# Patient Record
Sex: MALE | Race: WHITE | NOT HISPANIC OR LATINO | Employment: OTHER | URBAN - METROPOLITAN AREA
[De-identification: names, ages, dates, MRNs, and addresses within clinical notes are randomized per-mention and may not be internally consistent; named-entity substitution may affect disease eponyms.]

---

## 2023-12-15 ENCOUNTER — HOSPITAL ENCOUNTER (OUTPATIENT)
Dept: RADIOLOGY | Facility: HOSPITAL | Age: 88
Discharge: HOME | End: 2023-12-15
Payer: MEDICARE

## 2023-12-15 DIAGNOSIS — R10.9 UNSPECIFIED ABDOMINAL PAIN: ICD-10-CM

## 2023-12-15 PROCEDURE — 76770 US EXAM ABDO BACK WALL COMP: CPT

## 2023-12-15 PROCEDURE — 76770 US EXAM ABDO BACK WALL COMP: CPT | Performed by: STUDENT IN AN ORGANIZED HEALTH CARE EDUCATION/TRAINING PROGRAM

## 2023-12-19 ENCOUNTER — APPOINTMENT (OUTPATIENT)
Dept: RADIOLOGY | Facility: CLINIC | Age: 88
End: 2023-12-19
Payer: MEDICARE

## 2024-05-21 ENCOUNTER — TELEPHONE (OUTPATIENT)
Dept: HEMATOLOGY/ONCOLOGY | Facility: HOSPITAL | Age: 89
End: 2024-05-21
Payer: MEDICARE

## 2024-05-21 DIAGNOSIS — K14.8 TONGUE LESION: Primary | ICD-10-CM

## 2024-05-21 NOTE — TELEPHONE ENCOUNTER
Called and spoke with patient about NPV scheduled with Dr. Burkitt. Patient confirmed that his PCP wanted him to see an oral surgeon or dentist for a tongue lesion but he had too much trouble trying to get into one. His son reached out to the medical oncology department and was able to get him scheduled for a visit with Dr. Burkitt.     This RN talked with patient and confirmed that patient has no recent biopsy or even imaging to review. This RN explained that Dr. Burkitt sees patient's after they have a confirmed cancer diagnosis and that the best next step is to have him establish with ENT. The patient was agreeable to this plan and a STAT referral was placed for the patient to see ENT first.     I explained that after the ENT evaluates him that they will engage the medical oncology team (Dr. Burkitt) for next steps, if indicated. Patient appreciative of this information.     Dr. Burkitt aware of conversation with the patient and that referral to ENT was placed. NPV with Dr. Burkitt is canceled.

## 2024-06-05 ENCOUNTER — APPOINTMENT (OUTPATIENT)
Dept: HEMATOLOGY/ONCOLOGY | Facility: HOSPITAL | Age: 89
End: 2024-06-05
Payer: MEDICARE

## 2024-07-29 ENCOUNTER — APPOINTMENT (OUTPATIENT)
Dept: AUDIOLOGY | Facility: CLINIC | Age: 89
End: 2024-07-29
Payer: MEDICARE

## 2024-07-29 ENCOUNTER — APPOINTMENT (OUTPATIENT)
Dept: OTOLARYNGOLOGY | Facility: CLINIC | Age: 89
End: 2024-07-29
Payer: MEDICARE

## 2024-07-30 ENCOUNTER — APPOINTMENT (OUTPATIENT)
Dept: AUDIOLOGY | Facility: CLINIC | Age: 89
End: 2024-07-30
Payer: MEDICARE

## 2024-07-30 ENCOUNTER — APPOINTMENT (OUTPATIENT)
Dept: OTOLARYNGOLOGY | Facility: CLINIC | Age: 89
End: 2024-07-30
Payer: MEDICARE

## 2024-11-30 PROCEDURE — 89190 NASAL SMEAR FOR EOSINOPHILS: CPT

## 2024-12-03 ENCOUNTER — LAB REQUISITION (OUTPATIENT)
Dept: LAB | Facility: HOSPITAL | Age: 89
End: 2024-12-03
Payer: MEDICARE

## 2024-12-03 LAB
BACTERIA FOR EOSINOPHIL SMEAR: ABNORMAL
EOSINOPHIL SMEAR: ABNORMAL
NEUTROPHILS FOR EOSINOPHIL SMEAR: ABNORMAL